# Patient Record
Sex: MALE | URBAN - METROPOLITAN AREA
[De-identification: names, ages, dates, MRNs, and addresses within clinical notes are randomized per-mention and may not be internally consistent; named-entity substitution may affect disease eponyms.]

---

## 2022-12-09 ENCOUNTER — APPOINTMENT (OUTPATIENT)
Dept: URBAN - METROPOLITAN AREA SURGERY 20 | Age: 57
Setting detail: DERMATOLOGY
End: 2022-12-09

## 2022-12-09 VITALS — SYSTOLIC BLOOD PRESSURE: 147 MMHG | TEMPERATURE: 98 F | DIASTOLIC BLOOD PRESSURE: 87 MMHG | HEART RATE: 67 BPM

## 2022-12-09 VITALS — DIASTOLIC BLOOD PRESSURE: 93 MMHG | SYSTOLIC BLOOD PRESSURE: 137 MMHG | TEMPERATURE: 98 F | HEART RATE: 60 BPM

## 2022-12-09 PROBLEM — C44.41 BASAL CELL CARCINOMA OF SKIN OF SCALP AND NECK: Status: ACTIVE | Noted: 2022-12-09

## 2022-12-09 PROCEDURE — OTHER MOHS SURGERY: OTHER

## 2022-12-09 PROCEDURE — OTHER MIPS QUALITY: OTHER

## 2022-12-09 PROCEDURE — 12044 INTMD RPR N-HF/GENIT7.6-12.5: CPT

## 2022-12-09 PROCEDURE — 17311 MOHS 1 STAGE H/N/HF/G: CPT

## 2022-12-09 NOTE — PROCEDURE: MOHS SURGERY
Body Location Override (Optional - Billing Will Still Be Based On Selected Body Map Location If Applicable): jugular notch

## 2022-12-09 NOTE — HPI: PROCEDURE (MOHS)
Has The Growth Been Previously Biopsied?: has been previously biopsied
Body Location Override (Optional): Jugular notch

## 2022-12-09 NOTE — PROCEDURE: MOHS SURGERY
"Total time: 15 minutes.    Concerns/Problems: Sexually explicit thoughts and imagery. Increasing anxiety and compulsions theme of contaminiation. In [place] with his father; went to [employer]. Thoughts are becoming more violent. Revolving around family members. More aggressive thoughts recently. Sometimes flashing image. Sometimes \"sticks\" in the brain \"until I move on to something else.\" Distract self with phone/device.     Anxiety was a lot worse when he was younger. \"It would make me do these awful, awful things.\"     Now \"I'm a germa-phobe. I will wash my hands constantly when I think I've touched something dirty.\"    Pertinent history and ROS: History of anxiety and ADHD    Assessment: Compulsions and obsessions    Advice or instructions given to patient: Tell the whole story. Identify the compulsion. See if the urge to do the activity wanes with time. Report back about changes in compulsions.  " Length To Time In Minutes Device Was In Place: 10

## 2022-12-09 NOTE — PROCEDURE: MOHS SURGERY
Admission Consent 1/Introductory Paragraph: The rationale for Mohs was explained to the patient and consent was obtained. The risks, benefits and alternatives to therapy were discussed in detail. Specifically, the risks of infection, scarring, bleeding, prolonged wound healing, incomplete removal, allergy to anesthesia, nerve injury and recurrence were addressed. Prior to the procedure, the treatment site was clearly identified and confirmed by the patient. All components of Universal Protocol/PAUSE Rule completed.

## 2023-07-14 NOTE — PROCEDURE: MOHS SURGERY
Anesthesia Type: 1% lidocaine without epinephrine and a 1:10 solution of 8.4% sodium bicarbonate with 22mg clindamycin Tranexamic Acid Pregnancy And Lactation Text: It is unknown if this medication is safe during pregnancy or breast feeding.